# Patient Record
Sex: FEMALE | Employment: OTHER
[De-identification: names, ages, dates, MRNs, and addresses within clinical notes are randomized per-mention and may not be internally consistent; named-entity substitution may affect disease eponyms.]

---

## 2023-09-11 ENCOUNTER — TRANSCRIBE ORDERS (OUTPATIENT)
Dept: OTHER | Age: 83
End: 2023-09-11

## 2023-09-11 DIAGNOSIS — R41.3 MEMORY CHANGES: Primary | ICD-10-CM

## 2023-09-20 NOTE — TELEPHONE ENCOUNTER
RECORDS RECEIVED FROM: Care Everywhere    REASON FOR VISIT: Memory changes [R41.3]   Date of Appt: 10/25/23 1:00 pm    NOTES (FOR ALL VISITS) STATUS DETAILS   OFFICE NOTE from referring provider Care Everywhere 9/11/23 (Transcribed Orders), 7/24/23 Jonathan Vigil @ [facility]      OFFICE NOTE from other specialist Care Everywhere 5/12/23 Jasmyne Gupta APRN-CNP @[facility]     MEDICATION LIST N/A    IMAGING  (FOR ALL VISITS)     MRI (HEAD, NECK, SPINE) N/A    CT (HEAD, NECK, SPINE) N/A

## 2023-09-26 NOTE — TELEPHONE ENCOUNTER
RECORDS RECEIVED FROM: Care Everywhere   REASON FOR VISIT: Memory changes [R41.3]   Date of Appt: 10/12/23 11:00 am    NOTES (FOR ALL VISITS) STATUS DETAILS   OFFICE NOTE from referring provider Care Everywhere 9/11/23 (Transcribed Orders), 7/24/23 Jonathan Vigil @ [facility]          OFFICE NOTE from other specialist Care Everywhere 9/14/23 Becki Whittington, DO      5/12/23 Jasmyne Gupta, APRN-CNP @[facility]      MEDICATION LIST N/A    IMAGING  (FOR ALL VISITS)     MRI (HEAD, NECK, SPINE) N/A No Current Imaging   CT (HEAD, NECK, SPINE) N/A No Current Imaging

## 2023-10-12 ENCOUNTER — VIRTUAL VISIT (OUTPATIENT)
Dept: NEUROLOGY | Facility: CLINIC | Age: 83
End: 2023-10-12
Payer: COMMERCIAL

## 2023-10-12 ENCOUNTER — PRE VISIT (OUTPATIENT)
Dept: NEUROLOGY | Facility: CLINIC | Age: 83
End: 2023-10-12

## 2023-10-12 ENCOUNTER — TELEPHONE (OUTPATIENT)
Dept: NEUROLOGY | Facility: CLINIC | Age: 83
End: 2023-10-12

## 2023-10-12 DIAGNOSIS — R69 DIAGNOSIS DEFERRED: Primary | ICD-10-CM

## 2023-10-12 DIAGNOSIS — R41.3 MEMORY CHANGES: ICD-10-CM

## 2023-10-12 PROCEDURE — 99207 PR NO BILLABLE SERVICE THIS VISIT: CPT | Performed by: PSYCHIATRY & NEUROLOGY

## 2023-10-12 RX ORDER — CALCIUM CARBONATE/VITAMIN D3 600 MG-10
1 TABLET ORAL
COMMUNITY
Start: 2022-09-08

## 2023-10-12 RX ORDER — ACETAMINOPHEN 500 MG
500 TABLET ORAL
COMMUNITY

## 2023-10-12 RX ORDER — MEMANTINE HYDROCHLORIDE 10 MG/1
1 TABLET ORAL DAILY
COMMUNITY
Start: 2023-07-24

## 2023-10-12 RX ORDER — ERGOCALCIFEROL (VITAMIN D2) 10 MCG
400 TABLET ORAL
COMMUNITY
Start: 2022-09-08

## 2023-10-12 ASSESSMENT — PAIN SCALES - GENERAL: PAINLEVEL: NO PAIN (0)

## 2023-10-12 NOTE — PROGRESS NOTES
"Virtual Visit Details    Type of service:  Video Visit   Video Start Time: {video visit start/end time for provider to select:929637}  Video End Time:{video visit start/end time for provider to select:634362}    Originating Location (pt. Location): {video visit patient location:550790::\"Home\"}  {PROVIDER LOCATION On-site should be selected for visits conducted from your clinic location or adjoining [facility] hospital, academic office, or other nearby [facility] building. Off-site should be selected for all other provider locations, including home:969873}  Distant Location (provider location):  {virtual location provider:756277}  Platform used for Video Visit: {Virtual Visit Platforms:151615::\"[employer]\"}    "

## 2023-10-12 NOTE — PROGRESS NOTES
[facility] Neurology Consultation    Vivi Gutierrez MRN# [id number]   Age: 83 year old YOB: 1940     Requesting physician: Becki Kuo     Reason for Consultation: cognitive concerns      History of Presenting Symptoms:   Vivi Gutierrez is a 83 year old female who presents today for evaluation of cognitive concerns.  The patient is followed through the [facility] system and upon review it seems she has a pertinent medical history of b/l carpal tunnel syndrome, blepharoplasty in 2016, COVID-19 infection (02/2022 and 03/2022) with continued symptoms of (reactive airway disease, dyspnea with exertion, ageusia, anosmia), and sciatica on the left side.  She was seen with her PCP 5/12/2023 and reported having memory changes ( her  had worsening health and family noted decline in memory for years was worsening for patient).  I do not see a cognitive evaluation done (perhaps I am missing some records?), and no imaging was done.  The patient was started on Namenda.    The patient was not present with shared video, but instead was present by a separate video source.  Her daughter was holding an iphone with the patient present on video through it.  I indicates this was not a good way to do a virtual visit for a first time appointment and requested either new in-person visit be scheduled, or that my team could help with trying to obtain an appointment with outside sources ([facility]  I also indicated that the patient has recently set up their health care through the [facility] system, and perhaps their new PCP could see if there was a neurology appointment through that system which could be made earlier.    While I was unable to comment on the patient's imaging in an official sense, I did discuss with the patient's daughter that overall for cognitive evaluations to occur, there are often certain tests which are done such as cognitive performance testing, jaycee  cognitive assessment scores, and neuropsychology testing.  I also indicated that often Namenda is not often used as a first line agent for cognitive impairment but instead is often an adjunctive treatment used with Donepezil or rivastigmine.     I did express concern that the patient's evaluation is now delayed twice due to issues of scheduling, and will contact my nursing staff to see if there are any cancellations for in-person appointments or open spots soon within the memory care clinic for further evaluation.    Given the technical issues of the visit, I do not feel the patient should be billed.  GENEVA Wiseman D.O.  [facility] Neurology       Data: Pertinent since last visit   Imaging:  None pertinent    Laboratory:  9/14/2023:  Zinc, CMP, TSH, B12 ~ normal    7/27/2023:  A1c, CMP, Lipid panel ~ normal except for elevated LDL (175)

## 2023-10-12 NOTE — TELEPHONE ENCOUNTER
Called Sofia, informed her Dr. Wiseman would like to see pt in person.  Offered her Friday December 8th at 7am and she accepted the appt.  Informed her I will send a note to our scheduling team to get her scheduled.  Informed her clinic address and parking options and informed her check in time is 6:45am.  I will also send a my chart message. Per Sofia, pt had MRI imaging at [facility] in Sept  Informed her we will contact W and have imaging pushed to . Recommended she call us 1 week prior to appt to make sure we have received imaging and she understood.

## 2023-10-12 NOTE — TELEPHONE ENCOUNTER
Lino Wiseman, Ember Hernandez RN; Maureen Eubanks RN  Hi all,    Is there any way to see if this patient could be put on a cancellation list for in-person appointments with me or any general neurology provider.

## 2023-10-12 NOTE — NURSING NOTE
Is the patient currently in the state of [place]? YES    Visit mode:VIDEO    If the visit is dropped, the patient can be reconnected by: VIDEO VISIT: Text to cell phone:   Telephone Information:   Mobile [phone]       Will anyone else be joining the visit? NO  (If patient encounters technical issues they should call [phone])    How would you like to obtain your AVS? MyChart    Are changes needed to the allergy or medication list? No    Reason for visit: Consult    Clarisa HAINES

## 2023-10-12 NOTE — Clinical Note
Hi all,  Is there any way to see if this patient could be put on a cancellation list for in-person appointments with me or any general neurology provider.  Also, is there a way to see how long it would be to have her seen with our memory care specialist?  If there are no good options soon, would you be able to advise the patient (mostly her daughter) on how to be seen within [facility] clinic of neurology or [facility]?  Thanks, GENEVA

## 2023-10-12 NOTE — LETTER
10/12/2023       RE: Vivi Gutierrez  [address]  [place] [address]     Dear Colleague,    Thank you for referring your patient, Vivi Gutierrez, to the [facility] [place] [facility]. Please see a copy of my visit note below.    [facility] Neurology Consultation    Vivi Gutierrez MRN# [id number]   Age: 83 year old YOB: 1940     Requesting physician: Becki Kuo     Reason for Consultation: cognitive concerns      History of Presenting Symptoms:   Vivi Gutierrez is a 83 year old female who presents today for evaluation of cognitive concerns.  The patient is followed through the [facility] system and upon review it seems she has a pertinent medical history of b/l carpal tunnel syndrome, blepharoplasty in 2016, COVID-19 infection (02/2022 and 03/2022) with continued symptoms of (reactive airway disease, dyspnea with exertion, ageusia, anosmia), and sciatica on the left side.  She was seen with her PCP 5/12/2023 and reported having memory changes ( her  had worsening health and family noted decline in memory for years was worsening for patient).  I do not see a cognitive evaluation done (perhaps I am missing some records?), and no imaging was done.  The patient was started on Namenda.    The patient was not present with shared video, but instead was present by a separate video source.  Her daughter was holding an iphone with the patient present on video through it.  I indicates this was not a good way to do a virtual visit for a first time appointment and requested either new in-person visit be scheduled, or that my team could help with trying to obtain an appointment with outside sources ([facility] clinic of neurology or [facility]).  I also indicated that the patient has recently set up their health care through the [facility] system, and perhaps their new PCP could see if there was a neurology appointment through that  system which could be made earlier.    While I was unable to comment on the patient's imaging in an official sense, I did discuss with the patient's daughter that overall for cognitive evaluations to occur, there are often certain tests which are done such as cognitive performance testing, jaycee cognitive assessment scores, and neuropsychology testing.  I also indicated that often Namenda is not often used as a first line agent for cognitive impairment but instead is often an adjunctive treatment used with Donepezil or rivastigmine.     I did express concern that the patient's evaluation is now delayed twice due to issues of scheduling, and will contact my nursing staff to see if there are any cancellations for in-person appointments or open spots soon within the memory care clinic for further evaluation.    Given the technical issues of the visit, I do not feel the patient should be billed.         Data: Pertinent since last visit   Imaging:  None pertinent    Laboratory:  9/14/2023:  Zinc, CMP, TSH, B12 ~ normal    7/27/2023:  A1c, CMP, Lipid panel ~ normal except for elevated LDL (175)      Again, thank you for allowing me to participate in the care of your patient.      Sincerely,    Lino Wiseman, DO

## 2023-10-13 NOTE — TELEPHONE ENCOUNTER
Called [facility] at [phone], spoke to Ena.  Requested pt's MRI brain dated 9/26/23 be pushed to [facility] and she stated she will.

## 2023-10-25 ENCOUNTER — PRE VISIT (OUTPATIENT)
Dept: NEUROLOGY | Facility: CLINIC | Age: 83
End: 2023-10-25

## 2023-10-25 NOTE — TELEPHONE ENCOUNTER
RECORDS RECEIVED FROM: Care Everywhere   REASON FOR VISIT: Memoray Changes   Date of Appt: 12/8/23 @ 7:00 am    NOTES (FOR ALL VISITS) STATUS DETAILS   OFFICE NOTE from referring provider Care Everywhere 9/11/23 (Transcribed Orders), 7/24/23, 5/12/23 Jonathan Vigil NP @[facility]     OFFICE NOTE from other specialist Internal 10/12/23 Lino Wiseman DO @[facility]     MEDICATION LIST Internal    IMAGING  (FOR ALL VISITS)     MRI (HEAD, NECK, SPINE) Received [facility]  9/26/23 MR Head Brain

## 2023-12-08 ENCOUNTER — PRE VISIT (OUTPATIENT)
Dept: NEUROLOGY | Facility: CLINIC | Age: 83
End: 2023-12-08

## 2024-11-23 ENCOUNTER — HEALTH MAINTENANCE LETTER (OUTPATIENT)
Age: 84
End: 2024-11-23